# Patient Record
Sex: FEMALE | Race: WHITE | Employment: UNEMPLOYED | URBAN - METROPOLITAN AREA
[De-identification: names, ages, dates, MRNs, and addresses within clinical notes are randomized per-mention and may not be internally consistent; named-entity substitution may affect disease eponyms.]

---

## 2018-06-29 ENCOUNTER — OFFICE VISIT (OUTPATIENT)
Dept: FAMILY MEDICINE CLINIC | Age: 38
End: 2018-06-29

## 2018-06-29 ENCOUNTER — HOSPITAL ENCOUNTER (OUTPATIENT)
Dept: LAB | Age: 38
Discharge: HOME | End: 2018-06-29
Payer: COMMERCIAL

## 2018-06-29 VITALS
TEMPERATURE: 98.7 F | WEIGHT: 175 LBS | HEART RATE: 92 BPM | DIASTOLIC BLOOD PRESSURE: 69 MMHG | RESPIRATION RATE: 16 BRPM | HEIGHT: 64 IN | SYSTOLIC BLOOD PRESSURE: 103 MMHG | OXYGEN SATURATION: 96 % | BODY MASS INDEX: 29.88 KG/M2

## 2018-06-29 DIAGNOSIS — Z01.411 ENCOUNTER FOR WELL WOMAN EXAM WITH ABNORMAL FINDINGS: Primary | ICD-10-CM

## 2018-06-29 DIAGNOSIS — M53.3 COCCYDYNIA: ICD-10-CM

## 2018-06-29 PROCEDURE — 88175 CYTOPATH C/V AUTO FLUID REDO: CPT | Performed by: STUDENT IN AN ORGANIZED HEALTH CARE EDUCATION/TRAINING PROGRAM

## 2018-06-29 PROCEDURE — 87624 HPV HI-RISK TYP POOLED RSLT: CPT | Performed by: STUDENT IN AN ORGANIZED HEALTH CARE EDUCATION/TRAINING PROGRAM

## 2018-06-29 NOTE — PROGRESS NOTES
Chief Complaint   Patient presents with    Complete Physical     w/pap     1. Have you been to the ER, urgent care clinic since your last visit? Hospitalized since your last visit? No    2. Have you seen or consulted any other health care providers outside of the [address] [place] [address] since your last visit? Include any pap smears or colon screening.  No

## 2018-06-29 NOTE — MR AVS SNAPSHOT
[address] 
[phone] Patient: Bennie Salazar MRN: [id number] Bearstephanie Quick Visit Information Date & Time Provider Department Dept. Phone Encounter #  
 6/29/2018  1:00 PM Pako Welsh MD [facility] [phone] [id number] Follow-up Instructions Return in about 1 year (around 6/29/2019). Upcoming Health Maintenance Date Due Pneumococcal 19-64 Medium Risk (1 of 1 - PPSV23) 8/9/1997 PAP AKA CERVICAL CYTOLOGY 10/14/2016 Influenza Age 5 to Adult 8/1/2018 DTaP/Tdap/Td series (2 - Td) 6/25/2020 Allergies as of 6/29/2018  Review Complete On: 6/29/2018 By: Pako Welsh MD  
  
 Severity Noted Reaction Type Reactions Iodine  05/10/2010    Rash, Swelling Current Immunizations  Reviewed on 3/14/2014 Name Date Hepatitis B Vaccine 4/26/2011, 6/25/2010, 5/10/2010 Meningococcal Vaccine 5/10/2010 PPD 5/10/2010 TDAP Vaccine 6/25/2010 Not reviewed this visit You Were Diagnosed With   
  
 Codes Comments Encounter for well woman exam with abnormal findings    -  Primary ICD-10-CM: L01.531 ICD-9-CM: V72.31 Coccydynia     ICD-10-CM: M53.3 ICD-9-CM: 724.79 Vitals BP Pulse Temp Resp Height(growth percentile) Weight(growth percentile) 103/69 92 98.7 °F (37.1 °C) (Oral) 16 5' 4\" (1.626 m) 175 lb (79.4 kg) SpO2 BMI OB Status Smoking Status 96% 30.04 kg/m2 IUD Never Smoker Vitals History BMI and BSA Data Body Mass Index Body Surface Area 30.04 kg/m 2 1.89 m 2 Preferred Pharmacy Pharmacy Name Phone [place] [id number] [facility], [address] [place] [address] [facility] [phone] Your Updated Medication List  
  
   
This list is accurate as of 6/29/18  1:51 PM.  Always use your most recent med list.  
  
  
  
  
 albuterol 0.63 mg/3 mL nebulizer solution Commonly known as:  Doreatha Lipoma  
 3 mL by Nebulization route every six (6) hours as needed for Wheezing. albuterol-ipratropium 2.5 mg-0.5 mg/3 ml Nebu Commonly known as:  DUO-NEB  
3 mL by Nebulization route every six (6) hours as needed for Wheezing. guaiFENesin-codeine 100-10 mg/5 mL solution Commonly known as:  ROBITUSSIN AC Take 5 mL by mouth three (3) times daily as needed for Cough. Intrauterine Device (IUD) Iud  
by IntraUTERine route. Placed Nov 2013  
  
 montelukast 10 mg tablet Commonly known as:  SINGULAIR Take 1 Tab by mouth daily. naproxen 500 mg tablet Commonly known as:  NAPROSYN Take 1 Tab by mouth two (2) times daily (with meals). predniSONE 20 mg tablet Commonly known as:  DELTASONE  
3 tablets a day for 3 days, then 2 tablets a day for 3 days, then 1 tablet a day for 3 days We Performed the Following CBC WITH AUTOMATED DIFF [66610 CPT(R)] HEMOGLOBIN A1C WITH EAG [71997 CPT(R)] LIPID PANEL [72617 CPT(R)] METABOLIC PANEL, COMPREHENSIVE [20171 CPT(R)] TSH 3RD GENERATION [63254 CPT(R)] Follow-up Instructions Return in about 1 year (around 6/29/2019). To-Do List   
 06/29/2018 Imaging:  XR SACRUM AND COCCYX Patient Instructions Well Visit, Ages 25 to 48: Care Instructions Your Care Instructions Physical exams can help you stay healthy. Your doctor has checked your overall health and may have suggested ways to take good care of yourself. He or she also may have recommended tests. At home, you can help prevent illness with healthy eating, regular exercise, and other steps. Follow-up care is a key part of your treatment and safety. Be sure to make and go to all appointments, and call your doctor if you are having problems. It's also a good idea to know your test results and keep a list of the medicines you take. How can you care for yourself at home? · Reach and stay at a healthy weight. This will lower your risk for many problems, such as obesity, diabetes, heart disease, and high blood pressure. · Get at least 30 minutes of physical activity on most days of the week. Walking is a good choice. You also may want to do other activities, such as running, swimming, cycling, or playing tennis or team sports. Discuss any changes in your exercise program with your doctor. · Do not smoke or allow others to smoke around you. If you need help quitting, talk to your doctor about stop-smoking programs and medicines. These can increase your chances of quitting for good. · Talk to your doctor about whether you have any risk factors for sexually transmitted infections (STIs). Having one sex partner (who does not have STIs and does not have sex with anyone else) is a good way to avoid these infections. · Use birth control if you do not want to have children at this time. Talk with your doctor about the choices available and what might be best for you. · Protect your skin from too much sun. When you're outdoors from 10 a.m. to 4 p.m., stay in the shade or cover up with clothing and a hat with a wide brim. Wear sunglasses that block UV rays. Even when it's cloudy, put broad-spectrum sunscreen (SPF 30 or higher) on any exposed skin. · See a dentist one or two times a year for checkups and to have your teeth cleaned. · Wear a seat belt in the car. · Drink alcohol in moderation, if at all. That means no more than 2 drinks a day for men and 1 drink a day for women. Follow your doctor's advice about when to have certain tests. These tests can spot problems early. For everyone · Cholesterol. Have the fat (cholesterol) in your blood tested after age 21. Your doctor will tell you how often to have this done based on your age, family history, or other things that can increase your risk for heart disease. · Blood pressure. Have your blood pressure checked during a routine doctor visit. Your doctor will tell you how often to check your blood pressure based on your age, your blood pressure results, and other factors. · Vision. Talk with your doctor about how often to have a glaucoma test. 
· Diabetes. Ask your doctor whether you should have tests for diabetes. · Colon cancer. Have a test for colon cancer at age 48. You may have one of several tests. If you are younger than 48, you may need a test earlier if you have any risk factors. Risk factors include whether you already had a precancerous polyp removed from your colon or whether your parent, brother, sister, or child has had colon cancer. For women · Breast exam and mammogram. Talk to your doctor about when you should have a clinical breast exam and a mammogram. Medical experts differ on whether and how often women under 50 should have these tests. Your doctor can help you decide what is right for you. · Pap test and pelvic exam. Begin Pap tests at age 24. A Pap test is the best way to find cervical cancer. The test often is part of a pelvic exam. Ask how often to have this test. 
· Tests for sexually transmitted infections (STIs). Ask whether you should have tests for STIs. You may be at risk if you have sex with more than one person, especially if your partners do not wear condoms. For men · Tests for sexually transmitted infections (STIs). Ask whether you should have tests for STIs. You may be at risk if you have sex with more than one person, especially if you do not wear a condom. · Testicular cancer exam. Ask your doctor whether you should check your testicles regularly. · Prostate exam. Talk to your doctor about whether you should have a blood test (called a PSA test) for prostate cancer.  Experts differ on whether and when men should have this test. Some experts suggest it if you are older than 39 and are -American or have a father or brother who got prostate cancer when he was younger than 72. When should you call for help? Watch closely for changes in your health, and be sure to contact your doctor if you have any problems or symptoms that concern you. Where can you learn more? Go to http://jay-rk.info/. Enter P072 in the search box to learn more about \"Well Visit, Ages 25 to 48: Care Instructions. \" Current as of: May 12, 2017 Content Version: 11.4 © 0778-2629 [employer]. Care instructions adapted under license by [employer] (which disclaims liability or warranty for this information). If you have questions about a medical condition or this instruction, always ask your healthcare professional. [address] any warranty or liability for your use of this information. Introducing [facility]! Dear Doc Loaiza: Thank you for requesting a [employer]land[employer] account. Our records indicate that you already have an active [employer]land[employer] account. You can access your account anytime at [email or web address]. [email or web address] Did you know that you can access your hospital and ER discharge instructions at any time in Tourlandish? You can also review all of your test results from your hospital stay or ER visit. Additional Information If you have questions, please visit the Frequently Asked Questions section of the [employer]land[employer] website at [email or web address]. [email or web address]/. Remember, Tourlandish is NOT to be used for urgent needs. For medical emergencies, dial 911. Now available from your iPhone and Android! Please provide this summary of care documentation to your next provider. Your primary care clinician is listed as Cleo Christianson. If you have any questions after today's visit, please call [phone].

## 2018-06-29 NOTE — PATIENT INSTRUCTIONS

## 2018-06-29 NOTE — PROGRESS NOTES
[address] [facility] [address] [place] [address]   Office [phone], Fax [phone]      HPI:  Rolando Zuniga is a 42 y.o. female presenting for her annual checkup. Health conditions:   Asthma:  Well controlled. Rate use of rescue inhaler. once a week to once in 3 months. Concerns today:  Pain in coccyx area: s/p  9 years ago. Pain has been getting worse recently, especially after sitting for long period of time. Recent trip to [place] with family that exacerbated symptoms. Denies radiculopathy, loss of sensation. Pain relieved by OTC medications such as Tylenol or Ibuprofen. 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 Review of Systems  General Negative for fever, chills, changes in weight, changes in appetite   HEENT Negative for hearing loss, earache, congestion, sore throat   CV Negative for chest pain, palpitations, edema   Respiratory Negative for cough, shortness of breath, wheezing   GI Negative for change in bowel habits, abdominal pain, black or bloody stools, nausea or vomiting    Negative for frequency, dysuria, hematuria, vaginal discharge   MSK Negative for back pain, joint pain, muscle pain                       ObHx:   LMP: 2018, 2018   Menses: normal     via  X2,    Method of birth control: IUD, expiring  in 2018    [employer]   Sexually active?:yes   Number of sexual partners:    Type of sexual partners: male   Method of family planning: IUD    Health maintenance hx includes:   Exercise: Form of exercise: none, not motivated to work out   Diet: Likes fruits and vegetables   Cancer screening: Breast cancer screening: due in 2019   Cervical cancer screening: last PAP/Pelvic exam:  and was normal.              Fmhx: mother's sister breast cancer, diagnosed in 46s.  Mother with history of MI and CHF,             Diabetes, Bipolar   Safety at home/work: feels safe at home      Immunizations:     Immunization History   Administered Date(s) Administered    Hepatitis B Vaccine 05/10/2010, 06/25/2010, 04/26/2011    Meningococcal Vaccine 05/10/2010    PPD 05/10/2010    TDAP Vaccine 06/25/2010      Immunization status: up to date and documented. Past Medical History - Reviewed today  Patient Active Problem List   Diagnosis Code    [facility] ([facility] D35.2    Asthma J45.909    Cough R05         Medications - Reviewed today  Current Outpatient Prescriptions   Medication Sig Dispense Refill    albuterol-ipratropium (DUO-NEB) 2.5 mg-0.5 mg/3 ml nebulizer solution 3 mL by Nebulization route every six (6) hours as needed for Wheezing. 27 Vial 0    Intrauterine Device, IUD, IUD by IntraUTERine route. Placed Nov 2013      predniSONE (DELTASONE) 20 mg tablet 3 tablets a day for 3 days, then 2 tablets a day for 3 days, then 1 tablet a day for 3 days 18 Tab 0    guaiFENesin-codeine (ROBITUSSIN AC) 100-10 mg/5 mL solution Take 5 mL by mouth three (3) times daily as needed for Cough.  montelukast (SINGULAIR) 10 mg tablet Take 1 Tab by mouth daily. 30 Tab 0    albuterol (ACCUNEB) 0.63 mg/3 mL nebulizer solution 3 mL by Nebulization route every six (6) hours as needed for Wheezing. 1 Vial 1    naproxen (NAPROSYN) 500 mg tablet Take 1 Tab by mouth two (2) times daily (with meals).  20 Tab 0         Allergies - Reviewed today  Allergies   Allergen Reactions    Iodine Rash and Swelling         Family History - Reviewed today  Family History   Problem Relation Age of Onset    Diabetes Mother      Type 2     Anxiety Mother     Bipolar Disorder Mother     Depression Mother    [facility] Other Mother      PTSD-Diagnosed after 9/11, overweight    Heart Disease Mother     Heart Failure Mother     Cancer Maternal Aunt      Breast Cancer    Diabetes Maternal Uncle     Other Maternal Grandmother      over weight    Alcohol abuse Maternal Grandfather     Other Maternal Aunt      motor vehicle accident     MS Maternal Uncle          Social History - Reviewed today  Social History     Social History    Marital status:      Spouse name: N/A    Number of children: N/A    Years of education: N/A     Occupational History    Not on file. Social History Main Topics    Smoking status: Never Smoker    Smokeless tobacco: Never Used    Alcohol use Yes      Comment: occasionally    Drug use: No    Sexual activity: Yes     Partners: Male     Birth control/ protection: IUD, None     Other Topics Concern    Not on file     Social History Narrative         Physical Exam  Visit Vitals    /69    Pulse 92    Temp 98.7 °F (37.1 °C) (Oral)    Resp 16    Ht 5' 4\" (1.626 m)    Wt 175 lb (79.4 kg)    SpO2 96%    BMI 30.04 kg/m2       Body mass index is 30.04 kg/(m^2). General Alert. Cooperative. No distress. Head Normocephalic without obvious abnormality. Atraumatic. Sinuses nontender to percussion. Lungs Clear to auscultation bilaterally, no w/r/r/c, no increased work of breathing. Heart Regular rate and rhythm. Normal S1, S2. No murmurs, clicks, rubs or gallops. Abdomen Soft, non-tender. Bowel sounds normal. No masses. No organomegaly. Extremities Extremities normal. Atraumatic. No cyanosis. No edema. Pulses 2+ and symmetric throughout. Skin Skin color, texture, turgor normal. No rashes or lesions. Neurologic Strength 5/5 throughout. Reflexes 2+ throughout. Sensation intact throughout. Normal coordination and gait. Pelvic External genitalia normal without rashes or lesions. Pink and moist vaginal mucosa. Scant white discharge. Cervix without lesions or abnormal discharge. Uterus non tender and normal size. No adnexal masses or tenderness. No tenderness of pelvic floor muscles.  Tiny IUD strings coming through cervical os           Assessment/Plan:  Well woman - physical / health maintenance visit  - Pap smear: collected today  - Counseled re: diet, exercise, healthy lifestyle  - Patient to call in September to order an IUD for replacement in 11/2018  - Follow-up: Return for yearly wellness visits    Coccydynia: chronic condition that has been getting worse recently. Exacerbated after sitting for long period of time. Patient requesting for further investigation.   -  Full range of motion. No radiculopathy, no loss of sensation. Non tender to palpation in pelvic floor muscles on bimanual examination   - Will obtain X-ray of sacrum and coccyx  - Consider pelvic PT    Asthma: well controlled. Rarely uses rescue inhaler  - Offered Pneumovax vaccine at this visit based on guideline recommendations. , patient declined and wants to defer discussion at next visit. ICD-10-CM ICD-9-CM    1. Encounter for well woman exam with abnormal findings Z01.411 V72.31 CBC WITH AUTOMATED DIFF      METABOLIC PANEL, COMPREHENSIVE      LIPID PANEL      TSH 3RD GENERATION      HEMOGLOBIN A1C WITH EAG      PAP IG, APTIMA HPV AND RFX 16/18,45 ([id number])   2. Coccydynia M53.3 724.79 XR SACRUM AND COCCYX         Orders Placed This Encounter    XR SACRUM AND COCCYX     Standing Status:   Future     Number of Occurrences:   1     Standing Expiration Date:   7/29/2019     Order Specific Question:   Reason for Exam     Answer:   coccydynia, coccyx pain     Order Specific Question:   Is Patient Pregnant? Answer:   No    CBC WITH AUTOMATED DIFF    METABOLIC PANEL, COMPREHENSIVE    LIPID PANEL    TSH 3RD GENERATION    HEMOGLOBIN A1C WITH EAG    PAP IG, APTIMA HPV AND RFX 16/18,45 ([id number])     Order Specific Question:   Pap Source? Answer:   Endocervical     Order Specific Question:   Total Hysterectomy? Answer:   No     Order Specific Question:   Supracervical Hysterectomy? Answer:   No     Order Specific Question:   Post Menopausal?     Answer:   No     Order Specific Question:   Hormone Therapy? Answer:   No     Order Specific Question:   IUD? Answer:   Yes     Order Specific Question:   Abnormal Bleeding? Answer:    No Order Specific Question:   Pregnant     Answer:   No     Order Specific Question:   Post Partum? Answer:   No     I have discussed the diagnosis with the patient and the intended plan as seen in the above orders. The patient has received an after-visit summary and questions were answered concerning future plans. I have discussed medication side effects and warnings with the patient as well. Patient is discussed with Dr. Agatha De Luna.     Aster Murcia MD  Family Medicine Resident

## 2018-07-10 ENCOUNTER — TELEPHONE (OUTPATIENT)
Dept: FAMILY MEDICINE CLINIC | Age: 38
End: 2018-07-10

## 2018-07-10 DIAGNOSIS — M53.3 COCCYDYNIA: Primary | ICD-10-CM

## 2018-07-10 NOTE — TELEPHONE ENCOUNTER
Called patient per request to discuss about x-ray result of sacrum and coccyx. I explained to patient the negative result of imaging. Patient requesting for further evaluation for her pain in coccyx. Referred patient to pelvic PT. Answered all her questions and patient voiced understanding. Negative pap smear results were also discussed.

## 2018-08-29 ENCOUNTER — TELEPHONE (OUTPATIENT)
Dept: FAMILY MEDICINE CLINIC | Age: 38
End: 2018-08-29

## 2018-08-29 NOTE — TELEPHONE ENCOUNTER
Attempted to contact patient per Dr. Audi Becerra to see if patient followed up on referral placed for physical therapy. Left voicemail for patient to return call.

## 2018-09-10 ENCOUNTER — TELEPHONE (OUTPATIENT)
Dept: FAMILY MEDICINE CLINIC | Age: 38
End: 2018-09-10

## 2018-09-10 NOTE — TELEPHONE ENCOUNTER
Pt is requesting a call back from manager regarding visit in June, patient stated she was really unpleased with apt, she has called regarding billing for the apt several times because she did not get a resolution for her issue that visit and she doesn't feel like she should have to pay anything. Please call pt back regarding issue.  Thank you

## 2018-09-10 NOTE — TELEPHONE ENCOUNTER
I spoke with the patient. Her concern was mostly that the xray was taken while lying down, so it would not show anything, per her. I asked if she discussed this with the physicians during the visit, and she said no. The physician has ordered physical therapy to address the issue, but the patient told me that she does not feel that this is the right direction to go in. She did not want to see an [facility] physician at this time, and will call back if she would like to discuss an alternative direction to go with her concern.

## 2020-03-26 PROBLEM — Z86.018 HISTORY OF PROLACTINOMA: Status: ACTIVE | Noted: 2020-03-26

## 2022-01-13 ENCOUNTER — NURSE TRIAGE (OUTPATIENT)
Dept: OTHER | Facility: CLINIC | Age: 42
End: 2022-01-13

## 2022-01-13 NOTE — TELEPHONE ENCOUNTER
Received call from Agnieszka Muse at [facility] with Red Flag Complaint. Subjective: Caller states \"It feels like a muscle spasm that I'm not expecting. Like a heavy pounding. I measured my EKG on my Apple watch and can see an extra beat that is not in the same time frame that it should be. \"     Current Symptoms: Extra heart beats- pt reports an episode of 5 extra heart beats within 30 seconds; Fatigue    Onset: 2 weeks ago; worsening    Pain Severity: NA    What has been tried: Nothing    Recommended disposition: See HCP within 4 hours. Care advice provided, patient verbalizes understanding; denies any other questions or concerns; instructed to call back for any new or worsening symptoms. Writer provided warm transfer to Alisson Gates at [facility] for appointment scheduling    Attention Provider: Thank you for allowing me to participate in the care of your patient. The patient was connected to triage in response to information provided to the ECC. Please do not respond through this encounter as the response is not directed to a shared pool.     Reason for Disposition   [1] Skipped or extra beat(s) AND [2] occurs 4 or more times per minute    Protocols used: HEART RATE AND HEARTBEAT QUESTIONS-ADULT-

## 2022-01-27 ENCOUNTER — OFFICE VISIT (OUTPATIENT)
Dept: FAMILY MEDICINE CLINIC | Age: 42
End: 2022-01-27
Payer: COMMERCIAL

## 2022-01-27 VITALS
DIASTOLIC BLOOD PRESSURE: 71 MMHG | TEMPERATURE: 97.5 F | SYSTOLIC BLOOD PRESSURE: 108 MMHG | OXYGEN SATURATION: 99 % | HEIGHT: 64 IN | WEIGHT: 189.2 LBS | BODY MASS INDEX: 32.3 KG/M2 | HEART RATE: 75 BPM | RESPIRATION RATE: 18 BRPM

## 2022-01-27 DIAGNOSIS — Z13.220 LIPID SCREENING: ICD-10-CM

## 2022-01-27 DIAGNOSIS — R00.2 HEART PALPITATIONS: Primary | ICD-10-CM

## 2022-01-27 DIAGNOSIS — Z11.59 NEED FOR HEPATITIS C SCREENING TEST: ICD-10-CM

## 2022-01-27 PROBLEM — B02.9 ZOSTER WITHOUT COMPLICATIONS: Status: ACTIVE | Noted: 2020-11-17

## 2022-01-27 PROCEDURE — 93000 ELECTROCARDIOGRAM COMPLETE: CPT | Performed by: STUDENT IN AN ORGANIZED HEALTH CARE EDUCATION/TRAINING PROGRAM

## 2022-01-27 PROCEDURE — 99204 OFFICE O/P NEW MOD 45 MIN: CPT | Performed by: STUDENT IN AN ORGANIZED HEALTH CARE EDUCATION/TRAINING PROGRAM

## 2022-01-27 NOTE — ASSESSMENT & PLAN NOTE
DDx Arrhythmia vs electrolyte abnormality vs PVC/PAC vs Anxiety vs Caffeine use  Non smoker, no drugs, occasional glass of wine  Obtain labs to rule out etiologies  POC EKG NSR, non-ischemic, QTc wnl  Ordered Event monitor  Will f/u on labs and event monitor and consider referral to cardiology and Echo if indicated  Increase water intake, eliminated caffeine use (24 oz of coffee daily)  Counseled on weight loss, diet, exercise, and sleep hygeine

## 2022-01-27 NOTE — PROGRESS NOTES
Benny Acharya is a 35 y.o. female    Chief Complaint   Patient presents with    Palpitations     states she last felt this over the weekend. family history of stage 4 HF. denies CP and SOB. states they come on suddenly, lasts about 5-6 hours       1. Have you been to the ER, urgent care clinic since your last visit? Hospitalized since your last visit? Yes - urgent care    2. Have you seen or consulted any other health care providers outside of the [address] [place] [address] since your last visit? Include any pap smears or colon screening. NO    Vitals:    01/27/22 1517   BP: 108/71   BP 1 Location: Left upper arm   BP Patient Position: Sitting   BP Cuff Size: Adult   Pulse: 75   Temp: 97.5 °F (36.4 °C)   TempSrc: Temporal   Resp: 18   Height: 5' 4\" (1.626 m)   Weight: 189 lb 3.2 oz (85.8 kg)   SpO2: 99%             Health Maintenance Due   Topic Date Due    Hepatitis C Screening  Never done    COVID-19 Vaccine (1) Never done    DTaP/Tdap/Td series (2 - Td or Tdap) 06/25/2020    Lipid Screen  08/28/2020    Flu Vaccine (1) Never done         Medication Reconciliation completed, changes noted.   Please  Update medication list.

## 2022-01-27 NOTE — PROGRESS NOTES
[address] [facility] [address] [address]   Office [phone], Fax [phone]      Chief Complaint:     Chief Complaint   Patient presents with    Palpitations     states she last felt this over the weekend. family history of stage 4 HF. denies CP and SOB. states they come on suddenly, lasts about 5-6 hours       Barbara Cabello is a 35 y.o. female that presents for: heart palpatations      Assessment/Plan:   I personally reviewed the following Pertinent Labs/Studies:   - Encounter Notes from [id number], Labs from 2016. POC EKG NSR, non-ischemic, QTc wnl      Diagnoses and all orders for this visit:    1. Heart palpitations  Assessment & Plan:  DDx Arrhythmia vs electrolyte abnormality vs PVC/PAC vs Anxiety vs Caffeine use  Non smoker, no drugs, occasional glass of wine  Obtain labs to rule out etiologies  POC EKG NSR, non-ischemic, QTc wnl  Ordered Event monitor  Will f/u on labs and event monitor and consider referral to cardiology and Echo if indicated  Increase water intake, eliminated caffeine use (24 oz of coffee daily)  Counseled on weight loss, diet, exercise, and sleep hygeine    Orders:  -     METABOLIC PANEL, BASIC; Future  -     MAGNESIUM; Future  -     TSH 3RD GENERATION; Future  -     EVENT MONITOR POST SYMPTOMS; Future  -     AMB POC EKG ROUTINE W/ 12 LEADS, INTER & REP  -     CBC W/O DIFF; Future    2. Lipid screening  -     LIPID PANEL; Future    3. Need for hepatitis C screening test  -     HEPATITIS C AB; Future         Follow up: Follow-up and Dispositions    · Return in about 1 month (around 2/27/2022), or if symptoms worsen or fail to improve, for Well woman's exam.          Subjective:   HPI:  Barbara Cabello is a 35 y.o. female that presents for:    Heart Palpitations:  - No personal issues with thyroid  - No family history of thyroid issues  - Symptoms onset about 4 weeks ago  - Feels like an extra beat or a few extra beats.   - Last episode was the 22nd of January  - Occurs randomly  - During episodes,reports heart rate in the 70's and 90's per apple watch, and her watch says sinus rhythm  - Drinks 2 cups of coffee daily  - non smoker, socially drinker maybe 1 glass a week  - Reports good sleep  - Has asthma but not using inhaler currently, can't remember last time she needed inhaler  - No identifiable stress event preceding symptoms  - Denies dizziness, fatigue, CP, SOB, LE edema, abdominal, and n/v      ROS:   Review of Systems   Constitutional: Negative for chills, diaphoresis, fever and malaise/fatigue. Respiratory: Negative for cough and shortness of breath. Cardiovascular: Positive for palpitations. Negative for chest pain, orthopnea and leg swelling. Gastrointestinal: Negative for abdominal pain, nausea and vomiting. Neurological: Negative for dizziness and headaches. Psychiatric/Behavioral: The patient is not nervous/anxious. Past medical history, social history, and medications personally reviewed. Past Medical History:   Diagnosis Date    Asthma     since chldhood mostly seasonal last attack 2/2011 resolved nebulized albuterol    Cervical tear resulting from childbirth     Nabothian cyst     Prolactinoma ([facility] [address] 5/10/2010    Seasonal allergies         Allergies personally reviewed. Allergies   Allergen Reactions    Iodine Rash and Swelling     Other reaction(s): unknown          Objective:   Vitals reviewed. Visit Vitals  /71 (BP 1 Location: Left upper arm, BP Patient Position: Sitting, BP Cuff Size: Adult)   Pulse 75   Temp 97.5 °F (36.4 °C) (Temporal)   Resp 18   Ht 5' 4\" (1.626 m)   Wt 189 lb 3.2 oz (85.8 kg)   SpO2 99%   BMI 32.48 kg/m²        Physical Exam    Vitals Reviewed. General AO x 3. No distress. Not diaphoretic. No jaundice. No cyanosis. No pallor. Neck No thyromegaly present. No JVD. Cardio Normal rate, regular rhythm. No murmur, rubs, or gallop. Extremities No edema of lower extremities. Pulses 2+. Neurological CN II-XII grossly intact. No focal deficits. Skin No rash. Pt was discussed with Dr Maritza Ross (attending physician). I have reviewed pertinent labs results and other data. I have discussed the diagnosis with the patient and the intended plan as seen in the above orders. The patient has received an after-visit summary and questions were answered concerning future plans. I have discussed medication side effects and warnings with the patient as well.     Christy Beardne MD  Resident [facility]  01/27/22

## 2022-01-28 LAB
ANION GAP SERPL CALC-SCNC: 2 MMOL/L (ref 5–15)
BUN SERPL-MCNC: 9 MG/DL (ref 6–20)
BUN/CREAT SERPL: 12 (ref 12–20)
CALCIUM SERPL-MCNC: 9.6 MG/DL (ref 8.5–10.1)
CHLORIDE SERPL-SCNC: 105 MMOL/L (ref 97–108)
CHOLEST SERPL-MCNC: 216 MG/DL
CO2 SERPL-SCNC: 30 MMOL/L (ref 21–32)
CREAT SERPL-MCNC: 0.73 MG/DL (ref 0.55–1.02)
ERYTHROCYTE [DISTWIDTH] IN BLOOD BY AUTOMATED COUNT: 12.8 % (ref 11.5–14.5)
GLUCOSE SERPL-MCNC: 99 MG/DL (ref 65–100)
HCT VFR BLD AUTO: 39.5 % (ref 35–47)
HCV AB SERPL QL IA: NONREACTIVE
HDLC SERPL-MCNC: 40 MG/DL
HDLC SERPL: 5.4 {RATIO} (ref 0–5)
HGB BLD-MCNC: 12.3 G/DL (ref 11.5–16)
LDLC SERPL CALC-MCNC: 154.6 MG/DL (ref 0–100)
MAGNESIUM SERPL-MCNC: 2.5 MG/DL (ref 1.6–2.4)
MCH RBC QN AUTO: 28.9 PG (ref 26–34)
MCHC RBC AUTO-ENTMCNC: 31.1 G/DL (ref 30–36.5)
MCV RBC AUTO: 92.9 FL (ref 80–99)
NRBC # BLD: 0 K/UL (ref 0–0.01)
NRBC BLD-RTO: 0 PER 100 WBC
PLATELET # BLD AUTO: 432 K/UL (ref 150–400)
PMV BLD AUTO: 10.1 FL (ref 8.9–12.9)
POTASSIUM SERPL-SCNC: 4 MMOL/L (ref 3.5–5.1)
RBC # BLD AUTO: 4.25 M/UL (ref 3.8–5.2)
SODIUM SERPL-SCNC: 137 MMOL/L (ref 136–145)
TRIGL SERPL-MCNC: 107 MG/DL (ref ?–150)
TSH SERPL DL<=0.05 MIU/L-ACNC: 1.36 UIU/ML (ref 0.36–3.74)
VLDLC SERPL CALC-MCNC: 21.4 MG/DL
WBC # BLD AUTO: 8.1 K/UL (ref 3.6–11)

## 2022-03-18 PROBLEM — R00.2 HEART PALPITATIONS: Status: ACTIVE | Noted: 2022-01-27

## 2022-03-18 PROBLEM — Z86.018 HISTORY OF PROLACTINOMA: Status: ACTIVE | Noted: 2020-03-26

## 2022-03-20 PROBLEM — B02.9 ZOSTER WITHOUT COMPLICATIONS: Status: ACTIVE | Noted: 2020-11-17

## 2022-11-05 ENCOUNTER — HOSPITAL ENCOUNTER (EMERGENCY)
Age: 42
Discharge: HOME | End: 2022-11-05
Attending: EMERGENCY MEDICINE
Payer: COMMERCIAL

## 2022-11-05 ENCOUNTER — APPOINTMENT (OUTPATIENT)
Dept: GENERAL RADIOLOGY | Age: 42
End: 2022-11-05
Attending: EMERGENCY MEDICINE
Payer: COMMERCIAL

## 2022-11-05 VITALS
OXYGEN SATURATION: 98 % | BODY MASS INDEX: 29.99 KG/M2 | WEIGHT: 180 LBS | HEIGHT: 65 IN | TEMPERATURE: 98.3 F | HEART RATE: 78 BPM | SYSTOLIC BLOOD PRESSURE: 114 MMHG | RESPIRATION RATE: 14 BRPM | DIASTOLIC BLOOD PRESSURE: 78 MMHG

## 2022-11-05 DIAGNOSIS — M25.511 ACUTE PAIN OF RIGHT SHOULDER: Primary | ICD-10-CM

## 2022-11-05 PROCEDURE — 99284 EMERGENCY DEPT VISIT MOD MDM: CPT

## 2022-11-05 PROCEDURE — 73030 X-RAY EXAM OF SHOULDER: CPT

## 2022-11-05 PROCEDURE — 96372 THER/PROPH/DIAG INJ SC/IM: CPT

## 2022-11-05 PROCEDURE — 74011250636 HC RX REV CODE- 250/636: Performed by: EMERGENCY MEDICINE

## 2022-11-05 RX ORDER — KETOROLAC TROMETHAMINE 30 MG/ML
60 INJECTION, SOLUTION INTRAMUSCULAR; INTRAVENOUS ONCE
Status: DISCONTINUED | OUTPATIENT
Start: 2022-11-05 | End: 2022-11-05

## 2022-11-05 RX ORDER — KETOROLAC TROMETHAMINE 30 MG/ML
60 INJECTION, SOLUTION INTRAMUSCULAR; INTRAVENOUS
Status: COMPLETED | OUTPATIENT
Start: 2022-11-05 | End: 2022-11-05

## 2022-11-05 RX ORDER — KETOROLAC TROMETHAMINE 30 MG/ML
INJECTION, SOLUTION INTRAMUSCULAR; INTRAVENOUS
Status: DISPENSED
Start: 2022-11-05 | End: 2022-11-05

## 2022-11-05 RX ADMIN — KETOROLAC TROMETHAMINE 60 MG: 30 INJECTION, SOLUTION INTRAMUSCULAR; INTRAVENOUS at 10:24

## 2022-11-05 NOTE — ED PROVIDER NOTES
History of asthma, allergies. She presents with right shoulder pain. She states that it has been bothering her to a mild extent for the past few weeks. It was moderate to severe upon awakening this morning. She denies any trauma. No fever, cough, chest pain, dyspnea, or other complaints. It is worse with any movement.   No treatment prior to arrival.       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 Grewal[facility]NT    HX TONSILLECTOMY  April 2010    HX TONSILLECTOMY           Family History:   Problem Relation Age of Onset    Diabetes Mother         Type 2     Anxiety Mother     Bipolar Disorder Mother     Depression Mother     Other Mother         PTSD-Diagnosed after 9/11, overweight    Heart Disease Mother     Heart Failure Mother     Cancer Maternal Aunt         Breast Cancer    Diabetes Maternal Uncle     Other Maternal Grandmother         over weight    Alcohol abuse Maternal Grandfather     Other Maternal Aunt         motor vehicle accident     Mult Sclerosis Maternal Uncle        Social History     Socioeconomic History    Marital status:      Spouse name: Not on file    Number of children: Not on file    Years of education: Not on file    Highest education level: Not on file   Occupational History    Not on file   Tobacco Use    Smoking status: Never    Smokeless tobacco: Never   Substance and Sexual Activity    Alcohol use: Yes     Comment: occasionally    Drug use: No    Sexual activity: Yes     Partners: Male     Birth control/protection: I.U.D., None   Other Topics Concern    Not on file   Social History Narrative    Not on file     Social Determinants of Health     Financial Resource Strain: Not on file   Food Insecurity: Not on file   Transportation Needs: Not on file   Physical Activity: Not on file   Stress: Not on file   Social Connections: Not on file   Intimate Partner Violence: Not on file   Housing Stability: Not on file         ALLERGIES: Iodine    Review of Systems   All other systems reviewed and are negative. Vitals:    11/05/22 0959   BP: 114/78   Pulse: 78   Resp: 14   Temp: 98.3 °F (36.8 °C)   SpO2: 98%   Weight: 81.6 kg (180 lb)   Height: 5' 5\" (1.651 m)            Physical Exam  Vitals and nursing note reviewed. Constitutional:       Appearance: She is well-developed. HENT:      Head: Normocephalic and atraumatic. Eyes:      Conjunctiva/sclera: Conjunctivae normal.   Neck:      Trachea: No tracheal deviation. Cardiovascular:      Rate and Rhythm: Normal rate. Pulmonary:      Effort: Pulmonary effort is normal.   Abdominal:      General: There is no distension. Musculoskeletal:      Comments: Right anterior and lateral shoulder tenderness. Reproducible pain with active/passive range of motion. No redness, swelling, warmth. 2+ radial pulse. Skin:     General: Skin is dry. Neurological:      Mental Status: She is alert. MDM         Procedures    Progress Note:  Results, treatment, and follow up plan have been discussed with patient. Questions were answered. Gunjan Barahona MD  10:50 AM    Assessment/plan: Right shoulder pain. Suspect rotator cuff tendinitis. Reassuring appearance/exam with stable vital signs. Neurovascularly intact. X-ray negative. Sling for comfort for 2 days only. Home with recommendations of rest, ice, ibuprofen. PCP/Ortho follow-up. Return precautions.   Gunjan Barahona MD  10:51 AM

## 2022-11-05 NOTE — ED TRIAGE NOTES
Patient reports waking up with excruciating right shoulder pain and limited ROM. No known injury. Reports minor tenderness for the past few weeks.

## 2024-10-30 ENCOUNTER — OFFICE VISIT (OUTPATIENT)
Age: 44
End: 2024-10-30
Payer: COMMERCIAL

## 2024-10-30 VITALS
WEIGHT: 188.7 LBS | TEMPERATURE: 98.1 F | BODY MASS INDEX: 31.44 KG/M2 | OXYGEN SATURATION: 97 % | HEIGHT: 65 IN | HEART RATE: 75 BPM | DIASTOLIC BLOOD PRESSURE: 76 MMHG | SYSTOLIC BLOOD PRESSURE: 111 MMHG

## 2024-10-30 DIAGNOSIS — Z00.00 ADULT WELLNESS VISIT: ICD-10-CM

## 2024-10-30 DIAGNOSIS — Z12.31 ENCOUNTER FOR SCREENING MAMMOGRAM FOR MALIGNANT NEOPLASM OF BREAST: ICD-10-CM

## 2024-10-30 DIAGNOSIS — G89.29 CHRONIC RIGHT SHOULDER PAIN: ICD-10-CM

## 2024-10-30 DIAGNOSIS — J45.20 MILD INTERMITTENT ASTHMA WITHOUT COMPLICATION: ICD-10-CM

## 2024-10-30 DIAGNOSIS — M25.511 CHRONIC RIGHT SHOULDER PAIN: ICD-10-CM

## 2024-10-30 DIAGNOSIS — Z00.00 ADULT WELLNESS VISIT: Primary | ICD-10-CM

## 2024-10-30 PROCEDURE — 99386 PREV VISIT NEW AGE 40-64: CPT | Performed by: NURSE PRACTITIONER

## 2024-10-30 RX ORDER — ALBUTEROL SULFATE 90 UG/1
2 INHALANT RESPIRATORY (INHALATION) 4 TIMES DAILY PRN
Qty: 18 G | Refills: 1 | Status: SHIPPED | OUTPATIENT
Start: 2024-10-30

## 2024-10-30 RX ORDER — ALBUTEROL SULFATE 90 UG/1
2 INHALANT RESPIRATORY (INHALATION) EVERY 6 HOURS PRN
COMMUNITY

## 2024-10-30 RX ORDER — CELECOXIB 100 MG/1
100 CAPSULE ORAL 2 TIMES DAILY
Qty: 60 CAPSULE | Refills: 2 | Status: SHIPPED | OUTPATIENT
Start: 2024-10-30

## 2024-10-30 SDOH — ECONOMIC STABILITY: FOOD INSECURITY: WITHIN THE PAST 12 MONTHS, THE FOOD YOU BOUGHT JUST DIDN'T LAST AND YOU DIDN'T HAVE MONEY TO GET MORE.: NEVER TRUE

## 2024-10-30 SDOH — ECONOMIC STABILITY: FOOD INSECURITY: WITHIN THE PAST 12 MONTHS, YOU WORRIED THAT YOUR FOOD WOULD RUN OUT BEFORE YOU GOT MONEY TO BUY MORE.: NEVER TRUE

## 2024-10-30 SDOH — ECONOMIC STABILITY: INCOME INSECURITY: HOW HARD IS IT FOR YOU TO PAY FOR THE VERY BASICS LIKE FOOD, HOUSING, MEDICAL CARE, AND HEATING?: NOT VERY HARD

## 2024-10-30 ASSESSMENT — PATIENT HEALTH QUESTIONNAIRE - PHQ9
SUM OF ALL RESPONSES TO PHQ QUESTIONS 1-9: 0
SUM OF ALL RESPONSES TO PHQ9 QUESTIONS 1 & 2: 0
2. FEELING DOWN, DEPRESSED OR HOPELESS: NOT AT ALL
SUM OF ALL RESPONSES TO PHQ QUESTIONS 1-9: 0
1. LITTLE INTEREST OR PLEASURE IN DOING THINGS: NOT AT ALL

## 2024-10-30 NOTE — PROGRESS NOTES
Well Adult Note  Name: Flor Her Today’s Date: 10/30/2024   MRN: [id number] Sex: Female   Age: 44 y.o. Ethnicity:  /    : 1980 Race: White (non-)      Flor Her is here for a well adult exam.       Subjective   History:  Chronic R shoulder pain x approx 2 years  No trauma  Has seen ortho and was diagnosed with bursitis  She is still having pain with the shoulder   Left hand dominant      Review of Systems    Allergies   Allergen Reactions    Iodine Rash and Swelling     Other reaction(s): unknown     Prior to Visit Medications    Medication Sig Taking? Authorizing Provider   albuterol sulfate HFA (VENTOLIN HFA) 108 (90 Base) MCG/ACT inhaler Inhale 2 puffs into the lungs every 6 hours as needed for Wheezing Yes Provider, MD Yane   diphenhydrAMINE-APAP, sleep, (TYLENOL PM EXTRA STRENGTH PO) Take by mouth Yes ProviderYane MD   celecoxib (CELEBREX) 100 MG capsule Take 1 capsule by mouth 2 times daily Yes Susi Sood APRN - CNP   albuterol sulfate HFA (VENTOLIN HFA) 108 (90 Base) MCG/ACT inhaler Inhale 2 puffs into the lungs 4 times daily as needed for Wheezing Yes Susi Sood APRN - CNP   albuterol (ACCUNEB) 0.63 MG/3ML nebulizer solution Inhale 3 mLs into the lungs every 6 hours as needed Yes Automatic Reconciliation, Ar     Past Medical History:   Diagnosis Date    Asthma     since chldhood mostly seasonal last attack 2011 resolved nebulized albuterol    Bursitis of right shoulder     Cervical tear resulting from childbirth     Nabothian cyst     Prolactinoma ([facility]) 5/10/2010    Scoliosis     Seasonal allergies      Past Surgical History:   Procedure Laterality Date    HEENT      SEPTOPLASTY  May 2010    [facility]    TONSILLECTOMY  2010    TONSILLECTOMY       Family History   Problem Relation Age of Onset    Other Mother         PTSD-Diagnosed after , overweight    Depression Mother     Bipolar 
  Physical Activity: Not on file   Stress: Not on file   Social Connections: Not on file   Intimate Partner Violence: Not on file   Depression: Not at risk (10/30/2024)    PHQ-2     PHQ-2 Score: 0   Housing Stability: Unknown (10/30/2024)    Housing Stability Vital Sign     Unable to Pay for Housing in the Last Year: Not on file     Number of Times Moved in the Last Year: Not on file     Homeless in the Last Year: No   Interpersonal Safety: Not on file   Utilities: Not on file       Click Here for Release of Records Request

## 2024-10-31 LAB
ALBUMIN SERPL-MCNC: 4 G/DL (ref 3.5–5)
ALBUMIN/GLOB SERPL: 1.4 (ref 1.1–2.2)
ALP SERPL-CCNC: 127 U/L (ref 45–117)
ALT SERPL-CCNC: 23 U/L (ref 12–78)
ANION GAP SERPL CALC-SCNC: 9 MMOL/L (ref 2–12)
AST SERPL-CCNC: 9 U/L (ref 15–37)
BASOPHILS # BLD: 0.1 K/UL (ref 0–0.1)
BASOPHILS NFR BLD: 1 % (ref 0–1)
BILIRUB SERPL-MCNC: 0.2 MG/DL (ref 0.2–1)
BUN SERPL-MCNC: 9 MG/DL (ref 6–20)
BUN/CREAT SERPL: 11 (ref 12–20)
CALCIUM SERPL-MCNC: 8.8 MG/DL (ref 8.5–10.1)
CHLORIDE SERPL-SCNC: 105 MMOL/L (ref 97–108)
CHOLEST SERPL-MCNC: 194 MG/DL
CO2 SERPL-SCNC: 26 MMOL/L (ref 21–32)
CREAT SERPL-MCNC: 0.79 MG/DL (ref 0.55–1.02)
DIFFERENTIAL METHOD BLD: ABNORMAL
EOSINOPHIL # BLD: 0.1 K/UL (ref 0–0.4)
EOSINOPHIL NFR BLD: 1 % (ref 0–7)
ERYTHROCYTE [DISTWIDTH] IN BLOOD BY AUTOMATED COUNT: 13.1 % (ref 11.5–14.5)
EST. AVERAGE GLUCOSE BLD GHB EST-MCNC: 111 MG/DL
GLOBULIN SER CALC-MCNC: 2.9 G/DL (ref 2–4)
GLUCOSE SERPL-MCNC: 136 MG/DL (ref 65–100)
HBA1C MFR BLD: 5.5 % (ref 4–5.6)
HCT VFR BLD AUTO: 36.8 % (ref 35–47)
HDLC SERPL-MCNC: 38 MG/DL
HDLC SERPL: 5.1 (ref 0–5)
HGB BLD-MCNC: 11.7 G/DL (ref 11.5–16)
IMM GRANULOCYTES # BLD AUTO: 0 K/UL (ref 0–0.04)
IMM GRANULOCYTES NFR BLD AUTO: 0 % (ref 0–0.5)
LDLC SERPL CALC-MCNC: 124.6 MG/DL (ref 0–100)
LYMPHOCYTES # BLD: 2.4 K/UL (ref 0.8–3.5)
LYMPHOCYTES NFR BLD: 25 % (ref 12–49)
MCH RBC QN AUTO: 28.8 PG (ref 26–34)
MCHC RBC AUTO-ENTMCNC: 31.8 G/DL (ref 30–36.5)
MCV RBC AUTO: 90.6 FL (ref 80–99)
MONOCYTES # BLD: 0.5 K/UL (ref 0–1)
MONOCYTES NFR BLD: 5 % (ref 5–13)
NEUTS SEG # BLD: 6.4 K/UL (ref 1.8–8)
NEUTS SEG NFR BLD: 68 % (ref 32–75)
NRBC # BLD: 0 K/UL (ref 0–0.01)
NRBC BLD-RTO: 0 PER 100 WBC
PLATELET # BLD AUTO: 475 K/UL (ref 150–400)
PMV BLD AUTO: 10.1 FL (ref 8.9–12.9)
POTASSIUM SERPL-SCNC: 3.8 MMOL/L (ref 3.5–5.1)
PROT SERPL-MCNC: 6.9 G/DL (ref 6.4–8.2)
RBC # BLD AUTO: 4.06 M/UL (ref 3.8–5.2)
SODIUM SERPL-SCNC: 140 MMOL/L (ref 136–145)
T4 FREE SERPL-MCNC: 1 NG/DL (ref 0.8–1.5)
TRIGL SERPL-MCNC: 157 MG/DL
TSH SERPL DL<=0.05 MIU/L-ACNC: 1.18 UIU/ML (ref 0.36–3.74)
VLDLC SERPL CALC-MCNC: 31.4 MG/DL
WBC # BLD AUTO: 9.5 K/UL (ref 3.6–11)

## 2024-11-13 ENCOUNTER — OFFICE VISIT (OUTPATIENT)
Age: 44
End: 2024-11-13

## 2024-11-13 ENCOUNTER — HOSPITAL ENCOUNTER (OUTPATIENT)
Facility: HOSPITAL | Age: 44
Setting detail: SPECIMEN
Discharge: HOME | End: 2024-11-16
Payer: COMMERCIAL

## 2024-11-13 VITALS
BODY MASS INDEX: 32.01 KG/M2 | WEIGHT: 192.1 LBS | TEMPERATURE: 98.2 F | HEART RATE: 64 BPM | SYSTOLIC BLOOD PRESSURE: 121 MMHG | OXYGEN SATURATION: 99 % | HEIGHT: 65 IN | DIASTOLIC BLOOD PRESSURE: 79 MMHG

## 2024-11-13 DIAGNOSIS — Z12.4 ENCOUNTER FOR PAPANICOLAOU SMEAR FOR CERVICAL CANCER SCREENING: Primary | ICD-10-CM

## 2024-11-13 DIAGNOSIS — Z12.4 ENCOUNTER FOR PAPANICOLAOU SMEAR FOR CERVICAL CANCER SCREENING: ICD-10-CM

## 2024-11-13 PROCEDURE — 88142 CYTOPATH C/V THIN LAYER: CPT

## 2024-11-13 NOTE — PROGRESS NOTES
Progress Note        Pt presents for pap    Gynecologic Exam         Subjective:     Patient's medications, allergies, past medical, surgical, social and family histories were reviewed and updated as appropriate.    Review of Systems   All other systems reviewed and are negative.       Objective:     Vitals:    11/13/24 1542   BP: 121/79   Pulse: 64   Temp: 98.2 °F (36.8 °C)   SpO2: 99%   Weight: 87.1 kg (192 lb 1.6 oz)   Height: 1.651 m (5' 5\")       Physical Exam  Constitutional:       General: She is not in acute distress.     Appearance: Normal appearance. She is obese. She is not ill-appearing.   HENT:      Head: Normocephalic and atraumatic.      Nose: Nose normal.   Eyes:      Conjunctiva/sclera: Conjunctivae normal.   Cardiovascular:      Rate and Rhythm: Normal rate and regular rhythm.      Pulses: Normal pulses.      Heart sounds: Normal heart sounds.   Pulmonary:      Effort: Pulmonary effort is normal.      Breath sounds: Normal breath sounds.   Genitourinary:     Labia:         Right: No rash, tenderness, lesion or injury.         Left: No rash, tenderness, lesion or injury.       Urethra: No prolapse, urethral pain, urethral swelling or urethral lesion.      Vagina: Normal.      Cervix: Normal.      Uterus: Normal.    Musculoskeletal:         General: Normal range of motion.      Cervical back: Normal range of motion and neck supple.   Skin:     General: Skin is warm and dry.   Neurological:      General: No focal deficit present.      Mental Status: She is alert and oriented to person, place, and time.   Psychiatric:         Mood and Affect: Mood normal.         Behavior: Behavior normal.         Assessment/ Plan:     1. Encounter for Papanicolaou smear for cervical cancer screening  -     PAP LB, Reflex HPV ASCUS (199300); Future  -     Nuswab Vaginitis with Candida ([facility]); Future      Medication Side Effects and Warnings were discussed with patient    No follow-ups on file.     On this date

## 2024-11-13 NOTE — PROGRESS NOTES
Flor Her is a 44 y.o. female , id x 2(name and ). Reviewed record, history, and  medications.    Chief Complaint   Patient presents with    Gynecologic Exam       Vitals:    24 1542   BP: 121/79   Pulse: 64   Temp: 98.2 °F (36.8 °C)   SpO2: 99%   Weight: 87.1 kg (192 lb 1.6 oz)   Height: 1.651 m (5' 5\")       Non-Fasting    \"Have you been to the ER, urgent care clinic since your last visit?  Hospitalized since your last visit?\"    NO    “Have you seen or consulted any other health care providers outside of [facility] since your last visit?”    NO     “Have you had a pap smear?”    NO    Date of last Cervical Cancer screen (HPV or PAP): 2018     “Have you had a colorectal cancer screening such as a colonoscopy/FIT/Cologuard?    NO    No colonoscopy on file  No cologuard on file  No FIT/FOBT on file   No flexible sigmoidoscopy on file          No data to display                  10/30/2024     4:58 PM   PHQ-9    Little interest or pleasure in doing things 0   Feeling down, depressed, or hopeless 0   PHQ-2 Score 0   PHQ-9 Total Score 0          No data to display              Social Determinants of Health     Tobacco Use: Low Risk  (2024)    Patient History     Smoking Tobacco Use: Never     Smokeless Tobacco Use: Never     Passive Exposure: Not on file   Alcohol Use: Not on file   Financial Resource Strain: Low Risk  (10/30/2024)    Overall Financial Resource Strain (CARDIA)     Difficulty of Paying Living Expenses: Not very hard   Food Insecurity: No Food Insecurity (10/30/2024)    Hunger Vital Sign     Worried About Running Out of Food in the Last Year: Never true     Ran Out of Food in the Last Year: Never true   Transportation Needs: Unknown (10/30/2024)    PRAPARE - Transportation     Lack of Transportation (Medical): Not on file     Lack of Transportation (Non-Medical): No   Physical Activity: Not on file   Stress: Not on file   Social Connections: Not on file

## 2024-11-16 LAB
A VAGINAE DNA VAG QL NAA+PROBE: NORMAL SCORE
BACTERIAL VAGINOSIS, NAA: NORMAL
BVAB2 DNA VAG QL NAA+PROBE: NORMAL SCORE
C ALBICANS DNA VAG QL NAA+PROBE: NEGATIVE
C GLABRATA DNA VAG QL NAA+PROBE: NEGATIVE
CANDIDA KRUSEI: NEGATIVE
CANDIDA LUSITANIAE, NAA: NEGATIVE
CANDIDA PARAPSILOSIS/TROPICALIS: NEGATIVE
MEGA1 DNA VAG QL NAA+PROBE: NORMAL SCORE
SPECIMEN SOURCE: NORMAL
T VAGINALIS RRNA SPEC QL NAA+PROBE: NEGATIVE